# Patient Record
Sex: MALE | Race: BLACK OR AFRICAN AMERICAN | NOT HISPANIC OR LATINO | Employment: UNEMPLOYED | URBAN - METROPOLITAN AREA
[De-identification: names, ages, dates, MRNs, and addresses within clinical notes are randomized per-mention and may not be internally consistent; named-entity substitution may affect disease eponyms.]

---

## 2020-11-07 PROBLEM — F31.9 BIPOLAR 1 DISORDER: Status: ACTIVE | Noted: 2020-11-07

## 2020-11-07 PROBLEM — R74.8 ABNORMAL LIVER ENZYMES: Status: ACTIVE | Noted: 2020-11-07

## 2020-11-07 PROBLEM — R05.9 COUGH: Status: ACTIVE | Noted: 2020-11-07

## 2020-11-07 PROBLEM — K51.90 ULCERATIVE COLITIS: Status: ACTIVE | Noted: 2020-11-07

## 2020-11-07 PROBLEM — F10.929: Status: ACTIVE | Noted: 2017-05-07

## 2020-11-07 PROBLEM — M25.532 ACUTE PAIN OF LEFT WRIST: Status: ACTIVE | Noted: 2017-08-23

## 2020-11-07 PROBLEM — K52.9 COLITIS: Status: ACTIVE | Noted: 2020-11-07

## 2020-11-07 PROBLEM — E11.49 TYPE 2 DIABETES MELLITUS WITH NEUROLOGIC COMPLICATION, WITHOUT LONG-TERM CURRENT USE OF INSULIN: Status: ACTIVE | Noted: 2020-11-07

## 2020-11-07 PROBLEM — I10 HYPERTENSION: Status: ACTIVE | Noted: 2020-11-07

## 2020-11-10 PROBLEM — R05.9 COUGH: Status: RESOLVED | Noted: 2020-11-07 | Resolved: 2020-11-10

## 2020-11-10 PROBLEM — E11.49 TYPE 2 DIABETES MELLITUS WITH NEUROLOGIC COMPLICATION, WITHOUT LONG-TERM CURRENT USE OF INSULIN: Status: RESOLVED | Noted: 2020-11-07 | Resolved: 2020-11-10

## 2020-11-10 PROBLEM — K52.9 INFLAMMATORY BOWEL DISEASE: Status: ACTIVE | Noted: 2020-11-10

## 2020-11-10 PROBLEM — R74.8 ABNORMAL LIVER ENZYMES: Status: RESOLVED | Noted: 2020-11-07 | Resolved: 2020-11-10

## 2020-11-18 PROBLEM — K50.119 CROHN'S DISEASE OF COLON WITH COMPLICATION: Status: ACTIVE | Noted: 2020-11-07

## 2020-11-18 PROBLEM — Z23 NEED FOR HEPATITIS A AND B VACCINATION: Status: ACTIVE | Noted: 2020-11-18

## 2020-11-18 PROBLEM — Z23 NEED FOR VACCINATION FOR H FLU TYPE B: Status: ACTIVE | Noted: 2020-11-18

## 2020-11-22 ENCOUNTER — NURSE TRIAGE (OUTPATIENT)
Dept: ADMINISTRATIVE | Facility: CLINIC | Age: 60
End: 2020-11-22

## 2020-11-22 NOTE — TELEPHONE ENCOUNTER
COVID19 Post Procedure Tracker Outreach:  Attempted to contact patient for PP call, not available at time of call. No further follow up required.      Additional Information   Negative: Caller is angry or rude (e.g., hangs up, verbally abusive, yelling)   Negative: Caller hangs up   Negative: Caller has already spoken with the PCP and has no further questions.   Negative: Caller has already spoken with another triager and has no further questions.   Negative: Caller has already spoken with another triager or PCP AND has further questions AND triager able to answer questions.   Negative: Busy signal.  First attempt to contact caller.  Follow-up call scheduled within 15 minutes.   Negative: No answer.  First attempt to contact caller.  Follow-up call scheduled within 15 minutes.   Negative: Message left on identified voice mail   Negative: Message left on unidentified voice mail.  Phone number verified.   Negative: Message left with person in household.   Negative: Wrong number reached.  Phone number verified.   Negative: Second attempt to contact family AND no contact made.  Phone number verified.   Negative: Cell phone out of range.  Phone number verified.   Negative: Pager number given.  Answering service notified.   Negative: Patient already left for the hospital/clinic.   Negative: Caller has cancelled the call before the first contact   Negative: Unable to complete triage due to phone connection issues   Negative: NON-URGENT call redirected to PCP's office because it is open    Protocols used: NO CONTACT OR DUPLICATE CONTACT CALL-A-

## 2020-12-21 PROBLEM — I20.89 STABLE ANGINA: Status: ACTIVE | Noted: 2020-12-21

## 2021-04-16 PROBLEM — H35.033 HYPERTENSIVE RETINOPATHY OF BOTH EYES: Status: ACTIVE | Noted: 2021-04-16

## 2021-04-16 PROBLEM — H27.112: Status: ACTIVE | Noted: 2021-04-16

## 2021-04-16 PROBLEM — H25.813 COMBINED FORM OF AGE-RELATED CATARACT, BOTH EYES: Status: ACTIVE | Noted: 2021-04-16

## 2021-04-16 PROBLEM — E11.3293 MILD NONPROLIFERATIVE DIABETIC RETINOPATHY OF BOTH EYES WITHOUT MACULAR EDEMA ASSOCIATED WITH TYPE 2 DIABETES MELLITUS: Status: ACTIVE | Noted: 2021-04-16

## 2021-08-29 PROBLEM — I70.219 ATHEROSCLEROSIS OF NATIVE ARTERY OF EXTREMITY WITH INTERMITTENT CLAUDICATION: Status: ACTIVE | Noted: 2021-08-29

## 2022-06-10 PROBLEM — T81.30XA: Status: ACTIVE | Noted: 2022-06-10

## 2022-06-10 PROBLEM — S01.112S: Status: ACTIVE | Noted: 2022-06-10
